# Patient Record
Sex: MALE
[De-identification: names, ages, dates, MRNs, and addresses within clinical notes are randomized per-mention and may not be internally consistent; named-entity substitution may affect disease eponyms.]

---
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
## 2022-05-26 ENCOUNTER — EMERGENCY (EMERGENCY)
Facility: HOSPITAL | Age: 22
LOS: 0 days | Discharge: HOME | End: 2022-05-26
Attending: EMERGENCY MEDICINE
Payer: COMMERCIAL

## 2022-05-26 VITALS
HEART RATE: 95 BPM | DIASTOLIC BLOOD PRESSURE: 87 MMHG | OXYGEN SATURATION: 97 % | SYSTOLIC BLOOD PRESSURE: 147 MMHG | RESPIRATION RATE: 18 BRPM | TEMPERATURE: 98 F

## 2022-05-26 VITALS
HEART RATE: 65 BPM | RESPIRATION RATE: 16 BRPM | OXYGEN SATURATION: 100 % | TEMPERATURE: 98 F | SYSTOLIC BLOOD PRESSURE: 117 MMHG | DIASTOLIC BLOOD PRESSURE: 78 MMHG
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
## 2022-05-26 DIAGNOSIS — M62.82 RHABDOMYOLYSIS: ICD-10-CM

## 2022-05-26 DIAGNOSIS — M79.602 PAIN IN LEFT ARM: ICD-10-CM
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
## 2022-05-26 DIAGNOSIS — M79.601 PAIN IN RIGHT ARM: ICD-10-CM

## 2022-05-26 LAB
B BURGDOR C6 AB SER-ACNC: NEGATIVE — SIGNIFICANT CHANGE UP
B BURGDOR IGG+IGM SER-ACNC: 0.61 INDEX — SIGNIFICANT CHANGE UP (ref 0.01–0.89)
BASOPHILS # BLD AUTO: 0.02 K/UL — SIGNIFICANT CHANGE UP (ref 0–0.2)
BASOPHILS NFR BLD AUTO: 0.4 % — SIGNIFICANT CHANGE UP (ref 0–2)
EOSINOPHIL # BLD AUTO: 0.12 K/UL — SIGNIFICANT CHANGE UP (ref 0–0.5)
EOSINOPHIL NFR BLD AUTO: 2.4 % — SIGNIFICANT CHANGE UP (ref 0–6)
HCT VFR BLD CALC: 47.6 % — SIGNIFICANT CHANGE UP (ref 39–50)
HGB BLD-MCNC: 15.9 G/DL — SIGNIFICANT CHANGE UP (ref 13–17)
IMM GRANULOCYTES NFR BLD AUTO: 0.2 % — SIGNIFICANT CHANGE UP (ref 0–1.5)
LYMPHOCYTES # BLD AUTO: 0.72 K/UL — LOW (ref 1–3.3)
LYMPHOCYTES # BLD AUTO: 14.4 % — SIGNIFICANT CHANGE UP (ref 13–44)
MCHC RBC-ENTMCNC: 29.1 PG — SIGNIFICANT CHANGE UP (ref 27–34)
MCHC RBC-ENTMCNC: 33.4 GM/DL — SIGNIFICANT CHANGE UP (ref 32–36)
MCV RBC AUTO: 87 FL — SIGNIFICANT CHANGE UP (ref 80–100)
MONOCYTES # BLD AUTO: 0.5 K/UL — SIGNIFICANT CHANGE UP (ref 0–0.9)
MONOCYTES NFR BLD AUTO: 10 % — SIGNIFICANT CHANGE UP (ref 2–14)
NEUTROPHILS # BLD AUTO: 3.63 K/UL — SIGNIFICANT CHANGE UP (ref 1.8–7.4)
NEUTROPHILS NFR BLD AUTO: 72.6 % — SIGNIFICANT CHANGE UP (ref 43–77)
PLATELET # BLD AUTO: 226 K/UL — SIGNIFICANT CHANGE UP (ref 150–400)
RBC # BLD: 5.47 M/UL — SIGNIFICANT CHANGE UP (ref 4.2–5.8)
RBC # FLD: 12 % — SIGNIFICANT CHANGE UP (ref 10.3–14.5)
WBC # BLD: 5 K/UL — SIGNIFICANT CHANGE UP (ref 3.8–10.5)
WBC # FLD AUTO: 5 K/UL — SIGNIFICANT CHANGE UP (ref 3.8–10.5)

## 2022-05-26 PROCEDURE — 96374 THER/PROPH/DIAG INJ IV PUSH: CPT

## 2022-05-26 PROCEDURE — 80053 COMPREHEN METABOLIC PANEL: CPT

## 2022-05-26 PROCEDURE — 99284 EMERGENCY DEPT VISIT MOD MDM: CPT

## 2022-05-26 PROCEDURE — 99285 EMERGENCY DEPT VISIT HI MDM: CPT | Mod: 25

## 2022-05-26 PROCEDURE — 86618 LYME DISEASE ANTIBODY: CPT

## 2022-05-26 PROCEDURE — 83735 ASSAY OF MAGNESIUM: CPT

## 2022-05-26 PROCEDURE — 82550 ASSAY OF CK (CPK): CPT

## 2022-05-26 PROCEDURE — 85025 COMPLETE CBC W/AUTO DIFF WBC: CPT

## 2022-05-26 PROCEDURE — 36415 COLL VENOUS BLD VENIPUNCTURE: CPT

## 2022-05-26 RX ORDER — SODIUM CHLORIDE 9 MG/ML
1000 INJECTION INTRAMUSCULAR; INTRAVENOUS; SUBCUTANEOUS ONCE
Refills: 0 | Status: COMPLETED | OUTPATIENT
Start: 2022-05-26 | End: 2022-05-26

## 2022-05-26 RX ORDER — KETOROLAC TROMETHAMINE 30 MG/ML
30 SYRINGE (ML) INJECTION ONCE
Refills: 0 | Status: DISCONTINUED | OUTPATIENT
Start: 2022-05-26 | End: 2022-05-26

## 2022-05-26 RX ORDER — SODIUM CHLORIDE 9 MG/ML
1000 INJECTION, SOLUTION INTRAVENOUS
Refills: 0 | Status: DISCONTINUED | OUTPATIENT
Start: 2022-05-26 | End: 2022-05-26

## 2022-05-26 RX ORDER — CYCLOBENZAPRINE HYDROCHLORIDE 10 MG/1
10 TABLET, FILM COATED ORAL ONCE
Refills: 0 | Status: COMPLETED | OUTPATIENT
Start: 2022-05-26 | End: 2022-05-26

## 2022-05-26 RX ADMIN — SODIUM CHLORIDE 1000 MILLILITER(S): 9 INJECTION INTRAMUSCULAR; INTRAVENOUS; SUBCUTANEOUS at 13:49

## 2022-05-26 RX ADMIN — Medication 30 MILLIGRAM(S): at 10:59

## 2022-05-26 RX ADMIN — CYCLOBENZAPRINE HYDROCHLORIDE 10 MILLIGRAM(S): 10 TABLET, FILM COATED ORAL at 10:59

## 2022-05-26 RX ADMIN — SODIUM CHLORIDE 1000 MILLILITER(S): 9 INJECTION INTRAMUSCULAR; INTRAVENOUS; SUBCUTANEOUS at 10:55

## 2022-05-26 RX ADMIN — Medication 30 MILLIGRAM(S): at 14:03

## 2022-05-26 NOTE — ED PROVIDER NOTE - PROGRESS NOTE DETAILS
pt with rhabdomyolysis. discussed admission with patient. the patient declines at this time. 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 understands risks of permanent damage to his kidneys/death - states he will go home AMA and return tonight for admission/IVF. pt wants to leave now. pt signed ama form. Sylvester Do M.D., Attending Physician

## 2022-05-26 NOTE — ED ADULT NURSE NOTE - EXPLANATION OF PATIENT'S REASON FOR LEAVING
Pt states "personal reasons" for leaving and going home at this time. Verbalizes understanding of risks involved in leaving without further treatment.  Verbalizes understanding that he may return at any time to ED. Pt states "personal reasons" for leaving and going home at this time. Verbalizes understanding of risks involved in leaving without further treatment.  Verbalizes understanding that he may return at any time to ED.  Pt remains alert and oriented. Ambulating with steady gait.

## 2022-05-26 NOTE — ED PROVIDER NOTE - OBJECTIVE STATEMENT
21 yo male w/no pertinent PMH presents to the ED for b/l arm pain and stiffness x3 days. Pt states he is unable to fully extend both arms. Pt states he went to the gym a few days ago, didn't overly exert himself and symptoms have been worsening. Pain worse with flexing/extending arms. 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 Denies recent tick exposure. Denies trauma or falls. Denies fever/chills or rash. NKDA. Patient denies EtOH/tobacco/illicit substance use.

## 2022-05-26 NOTE — ED PROVIDER NOTE - NS ED ROS FT
Constitutional: No fever or chills  Eyes: No visual changes  HEENT: No throat pain  CV: No chest pain  Resp: No SOB no cough  GI: No abd pain, nausea or vomiting  : No dysuria  MSK: b/l arm pain  Skin: No rash  Neuro: No headache

## 2022-05-26 NOTE — ED PROVIDER NOTE - PHYSICAL EXAMINATION
Constitutional: NAD AAOx3  Eyes: PERRLA EOMI  Head: Normocephalic atraumatic  Mouth: MMM  Cardiac: regular rate   Resp: Lungs CTAB  GI: Abd s/nt/nd  Neuro: CN2-12 intact  Skin: No visible rashes  Ext: normal peripheral pulses, compartments are soft, no bony TTP, no redness warmth or swelling to joint, TTP over b/l bicep tendsons, normal passive ROM b/l extremities, muscle spasm to b/l biceps

## 2022-05-26 NOTE — ED PROVIDER NOTE - PATIENT PORTAL LINK FT
You can access the FollowMyHealth Patient Portal offered by [facility] by registering at the following website: [email or web address]. By joining [facility]’s FollowMyHealth portal, you will also be able to view your health information using other applications (apps) compatible with our system.

## 2022-05-26 NOTE — ED PROVIDER NOTE - NSFOLLOWUPINSTRUCTIONS_ED_ALL_ED_FT
1. return for worsening symptoms or anything concerning to you  2. take all home meds as prescribed  3. follow up with your pmd call to make an appointment  4. your are leaving against medical advice - return to the ED immediately to be admitted and get IV fluids      Rhabdomyolysis    WHAT YOU NEED TO KNOW:    What is rhabdomyolysis? Rhabdomyolysis is a condition where injured muscles release harmful substances into the bloodstream. These substances include potassium, phosphate, creatinine kinase, and myoglobin. Large amounts of these substances may damage your kidneys and other organs.     What causes rhabdomyolysis?   •Conditions, such as seizures, severe asthma, and infections. Excessive vomiting or diarrhea, diabetes, or problems of hyperthyroidism (thyroid storm) may also injure your muscles.      •Temperature extremes, such as hyperthermia (very high body temperature) or hypothermia (very low body temperature).      •Extreme muscular activity, such as running marathons, can cause muscle stress and injury.      •Medicines or harmful substances, such as antidepressants or cholesterol medicine may injury your muscles. An overdose of aspirin or diuretics may cause an electrolyte imbalance and muscle injury. Alcohol and illegal drugs such as amphetamines, opiates, ecstasy, and LSD can also cause muscle injury.      •Trauma to the muscles, such as a crushing injury, electrical shock, or severe burns, can cause rhabdomyolysis.      What are the signs and symptoms of rhabdomyolysis?   •Pain, swelling, bruising, or weakness in your legs, arms, or lower back      •Dark-colored urine, blood in the urine, or passing little or no urine at all      •Fast heartbeat      •Confusion or easy irritation      •Nausea and vomiting      •Trouble breathing      How is rhabdomyolysis diagnosed?   •Blood and urine tests may show damage to your kidneys and liver. These tests may also show which substances are released by your injured muscles.       •A CT or MRI may show the muscle injuries or changes. You may be given contrast liquid to help the muscles show up better in the pictures. Tell the healthcare provider if you have ever had an allergic reaction to contrast liquid. Do not enter the MRI room with anything metal. Metal can cause serious injury. Tell the healthcare provider if you have any metal in or on your body.      •A biopsy from your muscle may show which substances are being released. This can help healthcare providers plan your treatment.      How is rhabdomyolysis treated?   •Large amounts of IV fluid help flush substances through your kidneys. Medicines may be added to the fluid to help flush out harmful substances and get rid of extra fluid. Medicines may also help reduce the acidity of your urine.      •Dialysis cleans your blood when your kidneys cannot. Extra water, chemicals, and waste products are removed from your blood by a dialyzer or dialysis machine. The dialysis machine does this by passing your blood through a special filter, then returning it back to you.      •A blood transfusion is when you are given whole or parts of blood through an IV. Blood is tested for diseases, such as hepatitis and HIV, to be sure it is safe.       •Fasciotomy is surgery to cut tissues that cover your muscles. This decreases pressure on blood vessels and nerves caused by swelling of the injured muscle.       How can I manage my symptoms?   •Drink liquids as directed. Ask how much liquid to drink each day and which liquids are best for you. Drink more liquids if you are doing strenuous work, exercise, and if it is warm outside. Liquids help flush substances from your body.      •Do not drink alcohol. Heavy alcohol use may increase your risk for rhabdomyolysis.      When should I contact my healthcare provider?   •You have questions or concerns about your condition or care.          When should I seek immediate care or call 911?   •Your urine is dark or tea-colored or has blood in it.      •You have pain, swelling, or weakness in your arms or legs that does not go away or gets worse.      •You are urinating less than usual or not able to urinate.      •You have chest pain.      •Your heart is beating faster than usual or has a strange rhythm.      CARE AGREEMENT:    You have the right to help plan your care. Learn about your health condition and how it may be treated. Discuss treatment options with your healthcare providers to decide what care you want to receive. You always have the right to refuse treatment.

## 2022-05-26 NOTE — ED ADULT NURSE NOTE - OBJECTIVE STATEMENT
Pt reports B/L arm pain that increases with movement. Pt reports that B/L elbow and an area that spans approx six inches above and below elbows have dull pain at rest and increasing pain when trying to bend arms.  Awaiting MD shore at this time with verbalized understanding of POC.

## 2022-05-26 NOTE — ED ADULT TRIAGE NOTE - CHIEF COMPLAINT QUOTE
Pt arrives to ED complaining of b/l arm pain. pt states he is unable to fully flex both arms. Pt states symptoms started on Monday.

## 2022-05-26 NOTE — ED PROVIDER NOTE - NS_ ATTENDINGSCRIBEDETAILS _ED_A_ED_FT
I, Sylvester Do MD,  performed the initial face to face bedside interview with this patient regarding history of present illness, review of symptoms and relevant past medical, social and family history.  I completed an independent physical examination.  I was the initial provider who evaluated this patient.   I personally saw the patient and performed a substantive portion of the visit including all aspects of the medical decision making.  The history, relevant review of systems, past medical and surgical history, medical decision making, and physical examination was documented by the scribe in my presence and I attest to the accuracy of the documentation.

## 2022-05-26 NOTE — ED PROVIDER NOTE - CLINICAL SUMMARY MEDICAL DECISION MAKING FREE TEXT BOX
21 yo male presents to the ED with difficulty extending both arms after recent exercise a couple days ago. Exam with compartments are soft, no bony TTP, no redness warmth or swelling to joint, TTP over b/l bicep tendons, normal passive ROM b/l extremities, muscle spasm to b/l biceps. No signs of infections, septic joint, likely muscle spasm will check electrolytes, give fluids and reassess.

## 2023-02-27 NOTE — ED PROVIDER NOTE - DATE/TIME 1

## 2023-09-18 ENCOUNTER — NON-APPOINTMENT (OUTPATIENT)
Age: 23
End: 2023-09-18